# Patient Record
Sex: MALE | Race: WHITE | HISPANIC OR LATINO | Employment: OTHER | URBAN - METROPOLITAN AREA
[De-identification: names, ages, dates, MRNs, and addresses within clinical notes are randomized per-mention and may not be internally consistent; named-entity substitution may affect disease eponyms.]

---

## 2019-12-12 PROBLEM — K21.9 GERD (GASTROESOPHAGEAL REFLUX DISEASE): Status: ACTIVE | Noted: 2019-12-12

## 2019-12-23 DIAGNOSIS — C81.90 HODGKIN DISEASE IN CHILD (HCC): ICD-10-CM

## 2020-06-02 ENCOUNTER — HOSPITAL ENCOUNTER (OUTPATIENT)
Facility: MEDICAL CENTER | Age: 50
End: 2020-06-02
Admitting: HOSPITALIST
Payer: COMMERCIAL

## 2020-06-02 ENCOUNTER — APPOINTMENT (OUTPATIENT)
Dept: RADIOLOGY | Facility: MEDICAL CENTER | Age: 50
End: 2020-06-02
Attending: HOSPITALIST
Payer: COMMERCIAL

## 2020-06-02 ENCOUNTER — HOSPITAL ENCOUNTER (OUTPATIENT)
Facility: MEDICAL CENTER | Age: 50
End: 2020-06-04
Attending: HOSPITALIST | Admitting: HOSPITALIST
Payer: COMMERCIAL

## 2020-06-02 DIAGNOSIS — G45.9 TIA (TRANSIENT ISCHEMIC ATTACK): ICD-10-CM

## 2020-06-02 PROBLEM — E87.0 HYPERNATREMIA: Status: ACTIVE | Noted: 2020-06-02

## 2020-06-02 PROBLEM — E66.9 OBESITY: Status: ACTIVE | Noted: 2020-06-02

## 2020-06-02 PROBLEM — M54.2 NECK PAIN: Status: ACTIVE | Noted: 2020-06-02

## 2020-06-02 PROBLEM — R53.1 LEFT-SIDED WEAKNESS: Status: ACTIVE | Noted: 2020-06-02

## 2020-06-02 PROCEDURE — 99358 PROLONG SERVICE W/O CONTACT: CPT | Performed by: HOSPITALIST

## 2020-06-02 PROCEDURE — 99204 OFFICE O/P NEW MOD 45 MIN: CPT | Mod: 25 | Performed by: HOSPITALIST

## 2020-06-02 PROCEDURE — G0378 HOSPITAL OBSERVATION PER HR: HCPCS

## 2020-06-02 RX ORDER — SODIUM CHLORIDE 450 MG/100ML
INJECTION, SOLUTION INTRAVENOUS CONTINUOUS
Status: DISCONTINUED | OUTPATIENT
Start: 2020-06-03 | End: 2020-06-03

## 2020-06-02 RX ORDER — POLYETHYLENE GLYCOL 3350 17 G/17G
1 POWDER, FOR SOLUTION ORAL
Status: DISCONTINUED | OUTPATIENT
Start: 2020-06-02 | End: 2020-06-04 | Stop reason: HOSPADM

## 2020-06-02 RX ORDER — AMOXICILLIN 250 MG
2 CAPSULE ORAL 2 TIMES DAILY
Status: DISCONTINUED | OUTPATIENT
Start: 2020-06-02 | End: 2020-06-04 | Stop reason: HOSPADM

## 2020-06-02 RX ORDER — BISACODYL 10 MG
10 SUPPOSITORY, RECTAL RECTAL
Status: DISCONTINUED | OUTPATIENT
Start: 2020-06-02 | End: 2020-06-04 | Stop reason: HOSPADM

## 2020-06-02 RX ORDER — ASPIRIN 325 MG
325 TABLET ORAL DAILY
Status: DISCONTINUED | OUTPATIENT
Start: 2020-06-03 | End: 2020-06-04 | Stop reason: HOSPADM

## 2020-06-02 RX ORDER — ASPIRIN 81 MG/1
324 TABLET, CHEWABLE ORAL DAILY
Status: DISCONTINUED | OUTPATIENT
Start: 2020-06-03 | End: 2020-06-04 | Stop reason: HOSPADM

## 2020-06-02 RX ORDER — ATORVASTATIN CALCIUM 80 MG/1
80 TABLET, FILM COATED ORAL EVERY EVENING
Status: DISCONTINUED | OUTPATIENT
Start: 2020-06-03 | End: 2020-06-04 | Stop reason: HOSPADM

## 2020-06-02 RX ORDER — ASPIRIN 300 MG/1
300 SUPPOSITORY RECTAL DAILY
Status: DISCONTINUED | OUTPATIENT
Start: 2020-06-03 | End: 2020-06-04 | Stop reason: HOSPADM

## 2020-06-02 ASSESSMENT — ENCOUNTER SYMPTOMS
WEAKNESS: 1
VOMITING: 0
PALPITATIONS: 0
HEMOPTYSIS: 0
ABDOMINAL PAIN: 0
SENSORY CHANGE: 1
MYALGIAS: 0
FEVER: 0
FLANK PAIN: 0
CHILLS: 0
BRUISES/BLEEDS EASILY: 0
COUGH: 0
SPEECH CHANGE: 0
DOUBLE VISION: 0
DEPRESSION: 0
SHORTNESS OF BREATH: 0
HEARTBURN: 0
BLURRED VISION: 0
DIZZINESS: 0
HALLUCINATIONS: 0
FOCAL WEAKNESS: 0
NAUSEA: 0
EYE DISCHARGE: 0

## 2020-06-02 ASSESSMENT — LIFESTYLE VARIABLES: SUBSTANCE_ABUSE: 0

## 2020-06-03 ENCOUNTER — APPOINTMENT (OUTPATIENT)
Dept: CARDIOLOGY | Facility: MEDICAL CENTER | Age: 50
End: 2020-06-03
Attending: HOSPITALIST
Payer: COMMERCIAL

## 2020-06-03 ENCOUNTER — APPOINTMENT (OUTPATIENT)
Dept: RADIOLOGY | Facility: MEDICAL CENTER | Age: 50
End: 2020-06-03
Attending: HOSPITALIST
Payer: COMMERCIAL

## 2020-06-03 PROBLEM — E87.0 HYPERNATREMIA: Status: RESOLVED | Noted: 2020-06-02 | Resolved: 2020-06-03

## 2020-06-03 LAB
ALBUMIN SERPL BCP-MCNC: 4.3 G/DL (ref 3.2–4.9)
ALBUMIN/GLOB SERPL: 1.5 G/DL
ALP SERPL-CCNC: 59 U/L (ref 30–99)
ALT SERPL-CCNC: 40 U/L (ref 2–50)
ANION GAP SERPL CALC-SCNC: 11 MMOL/L (ref 7–16)
AST SERPL-CCNC: 19 U/L (ref 12–45)
BASOPHILS # BLD AUTO: 0.6 % (ref 0–1.8)
BASOPHILS # BLD: 0.03 K/UL (ref 0–0.12)
BILIRUB SERPL-MCNC: 0.4 MG/DL (ref 0.1–1.5)
BUN SERPL-MCNC: 12 MG/DL (ref 8–22)
CALCIUM SERPL-MCNC: 9.6 MG/DL (ref 8.5–10.5)
CHLORIDE SERPL-SCNC: 104 MMOL/L (ref 96–112)
CHOLEST SERPL-MCNC: 240 MG/DL (ref 100–199)
CK SERPL-CCNC: 77 U/L (ref 0–154)
CO2 SERPL-SCNC: 25 MMOL/L (ref 20–33)
CREAT SERPL-MCNC: 0.85 MG/DL (ref 0.5–1.4)
EOSINOPHIL # BLD AUTO: 0.14 K/UL (ref 0–0.51)
EOSINOPHIL NFR BLD: 2.6 % (ref 0–6.9)
ERYTHROCYTE [DISTWIDTH] IN BLOOD BY AUTOMATED COUNT: 41.4 FL (ref 35.9–50)
EST. AVERAGE GLUCOSE BLD GHB EST-MCNC: 108 MG/DL
GLOBULIN SER CALC-MCNC: 2.9 G/DL (ref 1.9–3.5)
GLUCOSE SERPL-MCNC: 95 MG/DL (ref 65–99)
HBA1C MFR BLD: 5.4 % (ref 0–5.6)
HCT VFR BLD AUTO: 47.5 % (ref 42–52)
HDLC SERPL-MCNC: 32 MG/DL
HGB BLD-MCNC: 16.3 G/DL (ref 14–18)
IMM GRANULOCYTES # BLD AUTO: 0.01 K/UL (ref 0–0.11)
IMM GRANULOCYTES NFR BLD AUTO: 0.2 % (ref 0–0.9)
LDLC SERPL CALC-MCNC: 180 MG/DL
LV EJECT FRACT  99904: 60
LV EJECT FRACT MOD 2C 99903: 66
LV EJECT FRACT MOD 4C 99902: 56.45
LV EJECT FRACT MOD BP 99901: 61.92
LYMPHOCYTES # BLD AUTO: 2.13 K/UL (ref 1–4.8)
LYMPHOCYTES NFR BLD: 39.8 % (ref 22–41)
MCH RBC QN AUTO: 31.2 PG (ref 27–33)
MCHC RBC AUTO-ENTMCNC: 34.3 G/DL (ref 33.7–35.3)
MCV RBC AUTO: 91 FL (ref 81.4–97.8)
MONOCYTES # BLD AUTO: 0.38 K/UL (ref 0–0.85)
MONOCYTES NFR BLD AUTO: 7.1 % (ref 0–13.4)
NEUTROPHILS # BLD AUTO: 2.66 K/UL (ref 1.82–7.42)
NEUTROPHILS NFR BLD: 49.7 % (ref 44–72)
NRBC # BLD AUTO: 0 K/UL
NRBC BLD-RTO: 0 /100 WBC
PLATELET # BLD AUTO: 227 K/UL (ref 164–446)
PMV BLD AUTO: 8.6 FL (ref 9–12.9)
POTASSIUM SERPL-SCNC: 4.1 MMOL/L (ref 3.6–5.5)
PROT SERPL-MCNC: 7.2 G/DL (ref 6–8.2)
RBC # BLD AUTO: 5.22 M/UL (ref 4.7–6.1)
SODIUM SERPL-SCNC: 140 MMOL/L (ref 135–145)
TRIGL SERPL-MCNC: 141 MG/DL (ref 0–149)
WBC # BLD AUTO: 5.4 K/UL (ref 4.8–10.8)

## 2020-06-03 PROCEDURE — A9270 NON-COVERED ITEM OR SERVICE: HCPCS | Performed by: HOSPITALIST

## 2020-06-03 PROCEDURE — 70498 CT ANGIOGRAPHY NECK: CPT

## 2020-06-03 PROCEDURE — 700102 HCHG RX REV CODE 250 W/ 637 OVERRIDE(OP): Performed by: HOSPITALIST

## 2020-06-03 PROCEDURE — 97165 OT EVAL LOW COMPLEX 30 MIN: CPT

## 2020-06-03 PROCEDURE — 82550 ASSAY OF CK (CPK): CPT

## 2020-06-03 PROCEDURE — 80061 LIPID PANEL: CPT

## 2020-06-03 PROCEDURE — 93306 TTE W/DOPPLER COMPLETE: CPT

## 2020-06-03 PROCEDURE — 700105 HCHG RX REV CODE 258: Performed by: HOSPITALIST

## 2020-06-03 PROCEDURE — 36415 COLL VENOUS BLD VENIPUNCTURE: CPT

## 2020-06-03 PROCEDURE — 93306 TTE W/DOPPLER COMPLETE: CPT | Mod: 26 | Performed by: INTERNAL MEDICINE

## 2020-06-03 PROCEDURE — 72142 MRI NECK SPINE W/DYE: CPT

## 2020-06-03 PROCEDURE — A9576 INJ PROHANCE MULTIPACK: HCPCS | Performed by: HOSPITALIST

## 2020-06-03 PROCEDURE — 97161 PT EVAL LOW COMPLEX 20 MIN: CPT

## 2020-06-03 PROCEDURE — 700117 HCHG RX CONTRAST REV CODE 255: Performed by: HOSPITALIST

## 2020-06-03 PROCEDURE — G0378 HOSPITAL OBSERVATION PER HR: HCPCS

## 2020-06-03 PROCEDURE — 85025 COMPLETE CBC W/AUTO DIFF WBC: CPT

## 2020-06-03 PROCEDURE — 99225 PR SUBSEQUENT OBSERVATION CARE,LEVEL II: CPT | Performed by: INTERNAL MEDICINE

## 2020-06-03 PROCEDURE — 80053 COMPREHEN METABOLIC PANEL: CPT

## 2020-06-03 PROCEDURE — 83036 HEMOGLOBIN GLYCOSYLATED A1C: CPT

## 2020-06-03 RX ORDER — M-VIT,TX,IRON,MINS/CALC/FOLIC 27MG-0.4MG
1 TABLET ORAL DAILY
COMMUNITY

## 2020-06-03 RX ADMIN — ATORVASTATIN CALCIUM 80 MG: 80 TABLET, FILM COATED ORAL at 16:58

## 2020-06-03 RX ADMIN — SODIUM CHLORIDE: 4.5 INJECTION, SOLUTION INTRAVENOUS at 01:05

## 2020-06-03 RX ADMIN — GADOTERIDOL 20 ML: 279.3 INJECTION, SOLUTION INTRAVENOUS at 19:23

## 2020-06-03 RX ADMIN — ASPIRIN 325 MG: 325 TABLET, FILM COATED ORAL at 05:54

## 2020-06-03 RX ADMIN — IOHEXOL 100 ML: 350 INJECTION, SOLUTION INTRAVENOUS at 10:03

## 2020-06-03 ASSESSMENT — COGNITIVE AND FUNCTIONAL STATUS - GENERAL
MOBILITY SCORE: 24
SUGGESTED CMS G CODE MODIFIER DAILY ACTIVITY: CH
MOBILITY SCORE: 24
DAILY ACTIVITIY SCORE: 24
SUGGESTED CMS G CODE MODIFIER MOBILITY: CH
SUGGESTED CMS G CODE MODIFIER DAILY ACTIVITY: CH
DAILY ACTIVITIY SCORE: 24
SUGGESTED CMS G CODE MODIFIER MOBILITY: CH

## 2020-06-03 ASSESSMENT — ENCOUNTER SYMPTOMS
DOUBLE VISION: 0
SPEECH CHANGE: 0
HEADACHES: 0
WEAKNESS: 0
NAUSEA: 0
FOCAL WEAKNESS: 0
SORE THROAT: 0
SENSORY CHANGE: 0
COUGH: 0
PALPITATIONS: 0
TINGLING: 0
SHORTNESS OF BREATH: 0
BACK PAIN: 1
VOMITING: 0
DIZZINESS: 0
ABDOMINAL PAIN: 0

## 2020-06-03 ASSESSMENT — LIFESTYLE VARIABLES
ON A TYPICAL DAY WHEN YOU DRINK ALCOHOL HOW MANY DRINKS DO YOU HAVE: 0
AVERAGE NUMBER OF DAYS PER WEEK YOU HAVE A DRINK CONTAINING ALCOHOL: 0
EVER FELT BAD OR GUILTY ABOUT YOUR DRINKING: NO
TOTAL SCORE: 0
ALCOHOL_USE: NO
EVER HAD A DRINK FIRST THING IN THE MORNING TO STEADY YOUR NERVES TO GET RID OF A HANGOVER: NO
TOTAL SCORE: 0
HAVE YOU EVER FELT YOU SHOULD CUT DOWN ON YOUR DRINKING: NO
DOES PATIENT WANT TO STOP DRINKING: NO
EVER_SMOKED: NEVER
HOW MANY TIMES IN THE PAST YEAR HAVE YOU HAD 5 OR MORE DRINKS IN A DAY: 0
CONSUMPTION TOTAL: NEGATIVE
TOTAL SCORE: 0
HAVE PEOPLE ANNOYED YOU BY CRITICIZING YOUR DRINKING: NO

## 2020-06-03 ASSESSMENT — PATIENT HEALTH QUESTIONNAIRE - PHQ9
1. LITTLE INTEREST OR PLEASURE IN DOING THINGS: NOT AT ALL
2. FEELING DOWN, DEPRESSED, IRRITABLE, OR HOPELESS: NOT AT ALL
SUM OF ALL RESPONSES TO PHQ9 QUESTIONS 1 AND 2: 0

## 2020-06-03 ASSESSMENT — GAIT ASSESSMENTS
DISTANCE (FEET): 100
GAIT LEVEL OF ASSIST: SUPERVISED

## 2020-06-03 ASSESSMENT — ACTIVITIES OF DAILY LIVING (ADL): TOILETING: INDEPENDENT

## 2020-06-03 NOTE — THERAPY
Missed Therapy     Patient Name: Humberto Diane  Age:  49 y.o., Sex:  male  Medical Record #: [id number]  Today's Date: 6/3/2020       06/03/20 1601   Initial Contact Note    Initial Contact Note  Order Received and Verified. Speech Therapy Evaluation NOT Completed Because Patient Does Not Require Acute Speech Therapy at this Time.   Interdisciplinary Plan of Care Collaboration   IDT Collaboration with  Nursing   Collaboration Comments Order received for a cognitive-linguistic evaluation.  Patient admitted with TIA.  MRI of the brain was negative.  Discussed case with RN who stated the patient is not demonstrating any cognitive deficits and does not require SLP intervention at this time.  SLP will complete the order.  Please re-order with a change in status.

## 2020-06-03 NOTE — CARE PLAN
Problem: Communication  Goal: The ability to communicate needs accurately and effectively will improve  Outcome: PROGRESSING AS EXPECTED     Problem: Safety  Goal: Will remain free from injury  Outcome: PROGRESSING AS EXPECTED     Problem: Venous Thromboembolism (VTW)/Deep Vein Thrombosis (DVT) Prevention:  Goal: Patient will participate in Venous Thrombosis (VTE)/Deep Vein Thrombosis (DVT)Prevention Measures  Outcome: PROGRESSING AS EXPECTED     Problem: Bowel/Gastric:  Goal: Normal bowel function is maintained or improved  Outcome: PROGRESSING AS EXPECTED     Problem: Knowledge Deficit  Goal: Knowledge of disease process/condition, treatment plan, diagnostic tests, and medications will improve  Outcome: PROGRESSING AS EXPECTED

## 2020-06-03 NOTE — PROGRESS NOTES
[facility] Medicine Daily Progress Note    Date of Service  6/3/2020    Chief Complaint  49 y.o. male admitted 6/2/2020 with left sided weakness.    Hospital Course    50 yo man who presented with hand numbness that progressed to left sided paralysis. His symptoms then resolved. MRI brain and MRA head were unremarkable. CTA neck did not show significant stenosis. TTE showed evidence of early right to left shunt suggestive of intracardiac shunt. He was evaluated by [facility] and had no further needs.      Interval Problem Update  Sinus on telemetry  He says his weakness and numbness resolved.  He denied ever having speech or swallowing difficulty or facial droop. He is visual difficulty with close text/words, denies other visual changes  Denies urinary or bowel incontinence or difficulty.     Consultants/Specialty  [facility]    Code Status  Full Code    Disposition  Home tomorrow pending workup    Review of Systems  Review of Systems   Constitutional: Negative for malaise/fatigue.   HENT: Negative for sore throat.    Eyes: Negative for double vision.   Respiratory: Negative for cough and shortness of breath.    Cardiovascular: Negative for chest pain and palpitations.   Gastrointestinal: Negative for abdominal pain, nausea and vomiting.   Genitourinary: Negative for dysuria, frequency and urgency.   Musculoskeletal: Positive for back pain (chronic).   Skin: Negative for itching.   Neurological: Negative for dizziness, tingling, sensory change, speech change, focal weakness, weakness and headaches.        Physical Exam  Temp:  [36.1 °C (96.9 °F)-36.3 °C (97.4 °F)] 36.3 °C (97.4 °F)  Pulse:  [60-68] 63  Resp:  [16-17] 16  BP: (121-150)/(76-97) 134/87  SpO2:  [95 %-97 %] 95 %    Physical Exam  Vitals signs and nursing note reviewed.   Constitutional:       General: He is not in acute distress.     Appearance: He is not ill-appearing.   HENT:      Head: Normocephalic.      Mouth/Throat:      Mouth: Mucous membranes are moist.    Eyes:      General:         Right eye: No discharge.         Left eye: No discharge.   Cardiovascular:      Rate and Rhythm: Normal rate and regular rhythm.   Pulmonary:      Effort: Pulmonary effort is normal.      Breath sounds: No wheezing or rales.   Abdominal:      Palpations: Abdomen is soft.      Tenderness: There is no abdominal tenderness. There is no guarding or rebound.   Musculoskeletal:         General: No swelling.   Skin:     General: Skin is warm and dry.   Neurological:      Mental Status: He is alert and oriented to person, place, and time.      Comments: AOx4, normal speech, no facial droop, 5/5 strength UE and LE, normal finger-to-nose bilaterally, normal heel-shin bilaterally, sensation intact   Psychiatric:         Mood and Affect: Mood normal.         Behavior: Behavior normal.         Fluids  No intake or output data in the 24 hours ending 06/03/20 1643    Laboratory  Recent Labs     06/02/20  1630 06/03/20  0202   WBC 5.3 5.4   RBC 4.92 5.22   HEMOGLOBIN 15.2 16.3   HEMATOCRIT 43.5 47.5   MCV 88.4 91.0   MCH 30.9 31.2   MCHC 34.9 34.3   RDW 12.5 41.4   PLATELETCT 232 227   MPV 8.7 8.6*     Recent Labs     06/02/20  1630 06/03/20  0202   SODIUM 147* 140   POTASSIUM 3.8 4.1   CHLORIDE 109* 104   CO2 24 25   GLUCOSE 139* 95   BUN 15 12   CREATININE 1.2 0.85   CALCIUM 11.0 9.6             Recent Labs     06/03/20  0202   TRIGLYCERIDE 141   HDL 32*   *       Imaging  EC-ECHOCARDIOGRAM COMPLETE W/O CONT   Final Result      CT-CTA NECK WITH & W/O-POST PROCESSING   Final Result      No high-grade stenosis, large vessel occlusion, aneurysm or dissection.      MR-CERVICAL SPINE-WITH    (Results Pending)        Assessment/Plan  * Left-sided weakness  Assessment & Plan  MRI brain w/o, MRA head unremarkable   CTA neck neg for significant stenosis  , chol 240  TTE showed Evidence of early (0-5 beats) right to left shunt  suggestive of   intracardiac shunt (ASD or PFO).  MRI C spine  pending  On aspirin and statin    Obesity  Assessment & Plan  Encouraged weight loss    Neck pain  Assessment & Plan  Suspect probably cervical radiculopathy based on symptoms   Check MRI cervical spine w/    GERD (gastroesophageal reflux disease)- (present on admission)  Assessment & Plan  Controlled cont to monitor       VTE prophylaxis: bernas

## 2020-06-03 NOTE — PROGRESS NOTES
ASsumed care of pt, he is alert and oriented/denies pain. NIH performed, see flowsheet. Discussed POC with NOC RN at bedside. Discussed safety. Bed is locked in lowest position. Call light/personal belongings are within reach.

## 2020-06-03 NOTE — PROGRESS NOTES
2 RN Skin Check    2 RN skin check complete w/ Colette RN.   Devices in place: Tele box, peripheral IV.  Skin assessed under devices: yes.  Confirmed pressure ulcers found on: N/A.  New potential pressure ulcers noted on N/A. Wound consult placed No.  The following interventions in place Pillows, Lotion and Patinet can turn from side to side.    Ears: pink, blanching  Elbows: dry, intact  Sacrum: intact  Heels/feet: dry, pink, blanching

## 2020-06-03 NOTE — CARE PLAN
Problem: Communication  Goal: The ability to communicate needs accurately and effectively will improve  Outcome: PROGRESSING AS EXPECTED  Intervention: [place] patient and significant other/support system to call light to alert staff of needs  Oriented to:: All of the Following : Location of Bathroom, Visiting Policy, Unit Routine, Call Light and Bedside Controls, Bedside Rail Policy, Smoking Policy, Rights and Responsibilities, Bedside Report, and Patient Education Notebook     Problem: Safety  Goal: Will remain free from falls  Outcome: PROGRESSING AS EXPECTED  Intervention: Implement fall precautions  Note: Patient is a low fall risk and has a steady gait. Bed is locked and in lowest position. Call light and belongings within reach. Treaded socks on. Will continue to monitor.

## 2020-06-03 NOTE — THERAPY
Physical Therapy   Initial Evaluation     Patient Name: Humberto Diane  Age:  49 y.o., Sex:  male  Medical Record #: [id number]  Today's Date: 6/3/2020    Assessment  Patient is 49 y.o. male with a diagnosis of TIA.  Patient denies any LE symptoms, states his neck is still sore, but doesn't believe it is correlated with the left sided weakness and inability to move his right hand for 1.5 weeks.  PMHx includes Obesity, hypernatremia, and Gerd.  Patient demonstrates good enough functional mobility to home.  Patient not receptive to reducing salt intake, increasing water intake, and starting exercise program to reduce risk of future cardiovascular event.  Secondary to patient's unwillingness to make lifestyle changes, patient at higher risk for future cardiovascular event.  Recommend ambulation with nursing staff daily.       06/03/20 0915   Non Verbal Descriptors   Non Verbal Scale  Calm   Prior Living Situation   Prior Services None   Housing / Facility 1 Story House   Steps Into Home 4   Steps In Home 0   Equipment Owned None   Lives with - Patient's Self Care Capacity Spouse;Child Less than 18 Years of Age   Prior Level of Functional Mobility   Bed Mobility Independent   Transfer Status Independent   Ambulation Independent   Distance Ambulation (Feet) 150   Assistive Devices Used None   Stairs Independent   Cognition    Cognition / Consciousness WDL   Level of Consciousness Alert   Passive ROM Lower Body   Passive ROM Lower Body WDL   Active ROM Lower Body    Active ROM Lower Body  WDL   Strength Lower Body   Lower Body Strength  WDL   Sensation Lower Body   Lower Extremity Sensation   WDL   Balance Assessment   Sitting Balance (Static) Good   Sitting Balance (Dynamic) Good   Standing Balance (Static) Good   Standing Balance (Dynamic) Good   Weight Shift Sitting Good   Weight Shift Standing Good   Comments no AD   Gait Analysis   Gait Level Of Assist Supervised   Assistive Device None   Distance (Feet) 100   # of Times  Distance was Traveled 1   # of Stairs Climbed 3   Level of Assist with Stairs Supervised   Weight Bearing Status fwb   Comments no difficulty with Funcitonal Mobility   Bed Mobility    Supine to Sit Supervised   Sit to Supine Supervised   Functional Mobility   Sit to Stand Supervised   How much difficulty does the patient currently have...   Turning over in bed (including adjusting bedclothes, sheets and blankets)? 4   Sitting down on and standing up from a chair with arms (e.g., wheelchair, bedside commode, etc.) 4   Moving from lying on back to sitting on the side of the bed? 4   How much help from another person does the patient currently need...   Moving to and from a bed to a chair (including a wheelchair)? 4   Need to walk in a hospital room? 4   Climbing 3-5 steps with a railing? 4   6 clicks Mobility Score 24   Education Group   Education Provided   (educated patient on diet and exercise needs for health)   Additional Comments pt declined outpatient PT.       Plan    Recommend Physical Therapy for Evaluation only     Discharge recommendations:  Home with outpatient physical therapy - patient states he does not want outpatient physical therapy for cardiac prevention.

## 2020-06-03 NOTE — PROGRESS NOTES
Med Rec Updated and Complete per Pt's family over the phone  Allergies Reviewed  No PO ABX last 14 days.    Family reports Pt only took ASA for chest pain.

## 2020-06-03 NOTE — THERAPY
Occupational Therapy   Initial Evaluation     Patient Name: Humberto Diane  Age:  49 y.o., Sex:  male  Medical Record #: [id number]  Today's Date: 6/3/2020                           Assessment  Patient is 49 y.o. male with a diagnosis of TIA.  Pt is at or near his/her functional baseline. Pt with no further skilled OT needs in the acute care setting at this time.       Plan    Occupational Therapy for Evaluation only     Discharge recommendations:  No further OT needs after D/C       06/03/20 0735   Prior Living Situation   Prior Services None   Housing / Facility 1 Story House   Steps Into Home 4   Steps In Home 0   Equipment Owned None   Lives with - Patient's Self Care Capacity Spouse;Child Less than 18 Years of Age   Prior Level of ADL Function   Self Feeding Independent   Grooming / Hygiene Independent   Bathing Independent   Dressing Independent   Toileting Independent   ADL Assessment   Upper Body Dressing Independent   Lower Body Dressing Independent   Toileting Independent   Functional Mobility   Sit to Stand Supervised   Bed, Chair, Wheelchair Transfer Supervised   Toilet Transfers Supervised   Session Information   Priority 0

## 2020-06-03 NOTE — PROGRESS NOTES
Received ED to inpatient transfer request from [facility]   Sending Physician: Eric      Diagnosis TIA   Patient Accepted by: Chandu TRAN     Patient coming via: TBD  ETA: TBD   Nursing to notify Direct Admit On-Call hospitalist when patient arrives

## 2020-06-03 NOTE — ASSESSMENT & PLAN NOTE
MRI brain w/o, MRA head unremarkable   CTA neck neg for significant stenosis  , chol 240  TTE showed Evidence of early (0-5 beats) right to left shunt  suggestive of   intracardiac shunt (ASD or PFO).  MRI C spine pending  On aspirin and statin

## 2020-06-03 NOTE — PROGRESS NOTES
Patient is a direct admit, assumed care of patient from EMS, report received from Oly BYRNE. Patient signed consent to treat form. Patient placed on the monitor. Monitor room called, patient is SR in the 70's. Patient is A&O X4. Patient vital signs stable. Patient oriented to room and floor. Updated on POC, call light within reach and fall precautions in place. Bed locked and in lowest position. Patient instructed to call for assistance before getting out of bed. All questions answered, no other needs at this time. On call hospitalist Dr. Yolanda sanchez.

## 2020-06-03 NOTE — H&P
Hospital Medicine History & Physical Note    Date of Service  6/2/2020    Primary Care Physician  TANMAY Mcclain    Code Status  Full     Chief Complaint  Left sided weakness     History of Presenting Illness  49 y.o. male who presented 6/2/2020 with no significant past medical history presents with left-sided weakness.  This patient states about a week and a half ago his right hand started having some numbness and tingling.  Subsequently he had inability to open his hand this lasted for several hours.  It was associated with some neck and upper back pain.  This resided on its own.  Last Saturday he started developing some upper neck and back pain.  And then acute left-sided paralysis.  This lasted about 5 minutes.  Associated with numbness and tingling.  Otherwise no known alleviating or exacerbating factors to his symptoms.  To my examination continues to have left-sided numbness and tingling and will be admitted to the hospital for further TIA work-up.    Upon review of outside hospital records    WBC count 5.3 hemoglobin 15.2 platelet count 232  Sodium 147 potassium 3.8 chloride 108 CO2 24 anion gap 18 glucose 139 BUN 15 creatinine 1.2 calcium 11  Troponin 0.02  Urinalysis unremarkable  MRI brain without unremarkable  MRA of head unremarkable MRA of [place] of [place]  Review of Systems  Review of Systems   Constitutional: Positive for malaise/fatigue. Negative for chills and fever.   HENT: Negative for congestion, hearing loss and tinnitus.    Eyes: Negative for blurred vision, double vision and discharge.   Respiratory: Negative for cough, hemoptysis and shortness of breath.    Cardiovascular: Negative for chest pain, palpitations and leg swelling.   Gastrointestinal: Negative for abdominal pain, heartburn, nausea and vomiting.   Genitourinary: Negative for dysuria and flank pain.   Musculoskeletal: Negative for joint pain and myalgias.   Skin: Negative for rash.   Neurological: Positive for sensory  change and weakness. Negative for dizziness, speech change and focal weakness.   Endo/Heme/Allergies: Negative for environmental allergies. Does not bruise/bleed easily.   Psychiatric/Behavioral: Negative for depression, hallucinations and substance abuse.       Past Medical History   has a past medical history of Bloating, Gas, and History of chicken pox.    Surgical History  Reviewed and not pertinent     Family History  Reviewed and not pertinent     Social History   reports that he has never smoked. He has never used smokeless tobacco. He reports current alcohol use. He reports that he does not use drugs.    Allergies  No Known Allergies    Medications  Prior to Admission Medications   Prescriptions Last Dose Informant Patient Reported? Taking?   aspirin (ASA) 81 MG Chew Tab chewable tablet   Yes No   Sig: Take 81 mg by mouth every day.      Facility-Administered Medications: None       Physical Exam  BP: ()/()     Physical Exam  Vitals signs reviewed.   Constitutional:       General: He is not in acute distress.     Appearance: He is ill-appearing.   HENT:      Head: Normocephalic and atraumatic.      Nose: No congestion.      Mouth/Throat:      Mouth: Mucous membranes are dry.   Eyes:      Extraocular Movements: Extraocular movements intact.      Pupils: Pupils are equal, round, and reactive to light.   Neck:      Musculoskeletal: Neck supple.   Cardiovascular:      Rate and Rhythm: Normal rate and regular rhythm.      Pulses: Normal pulses.      Heart sounds: Normal heart sounds.   Pulmonary:      Effort: Pulmonary effort is normal. No respiratory distress.      Breath sounds: Normal breath sounds. No wheezing.   Abdominal:      General: Bowel sounds are normal. There is no distension.      Palpations: Abdomen is soft.      Tenderness: There is no abdominal tenderness.   Musculoskeletal:         General: No swelling.   Skin:     General: Skin is warm and dry.      Capillary Refill: Capillary refill takes 2 to  3 seconds.   Neurological:      General: No focal deficit present.      Mental Status: He is alert and oriented to person, place, and time. Mental status is at baseline.      Comments: No focal deficits   Psychiatric:         Mood and Affect: Mood normal.         Behavior: Behavior normal.         Thought Content: Thought content normal.         Judgment: Judgment normal.         Laboratory:  Recent Labs     06/02/20  1630   WBC 5.3   RBC 4.92   HEMOGLOBIN 15.2   HEMATOCRIT 43.5   MCV 88.4   MCH 30.9   MCHC 34.9   RDW 12.5   PLATELETCT 232   MPV 8.7     Recent Labs     06/02/20  1630   SODIUM 147*   POTASSIUM 3.8   CHLORIDE 109*   CO2 24   GLUCOSE 139*   BUN 15   CREATININE 1.2   CALCIUM 11.0     Recent Labs     06/02/20  1630   GLUCOSE 139*         No results for input(s): NTPROBNP in the last 72 hours.      No results for input(s): TROPONINT in the last 72 hours.    Imaging:  No orders to display         Assessment/Plan:  Anticipate less than 2 midnight hospital stay     * Left-sided weakness  Assessment & Plan  Acute left sided weakness occurred on Saturday  Outside window for TPA, and permissive htn   Asa, statin therapy  MRI brain w/o, MRA head unremarkable   CTA neck ordered for carotid eval  echocardiogarm   Asa, statin therapy   A1c, lipid panel     Obesity  Assessment & Plan  Encouraged weight loss    Hypernatremia  Assessment & Plan  Cont with 1/2 ns overnight reassess in am       Neck pain  Assessment & Plan  Suspect probably cervical radiculopathy based on symptoms   Check MRI cervical spine w/    GERD (gastroesophageal reflux disease)- (present on admission)  Assessment & Plan  Controlled cont to monitor    Ppx: SCD    I spent a total of 31 minutes of non face to face time performing additional research, reviewing old medical records, provided on going management by following up on labs, placing orders, discussing plan of care with other healthcare providers and nursing staff. Start time: 11:15 pm. End  time: 11:46 pm

## 2020-06-04 ENCOUNTER — PATIENT OUTREACH (OUTPATIENT)
Dept: HEALTH INFORMATION MANAGEMENT | Facility: OTHER | Age: 50
End: 2020-06-04

## 2020-06-04 VITALS
OXYGEN SATURATION: 97 % | SYSTOLIC BLOOD PRESSURE: 140 MMHG | TEMPERATURE: 96.8 F | RESPIRATION RATE: 18 BRPM | DIASTOLIC BLOOD PRESSURE: 87 MMHG | BODY MASS INDEX: 30.07 KG/M2 | WEIGHT: 198.41 LBS | HEIGHT: 68 IN | HEART RATE: 65 BPM

## 2020-06-04 PROBLEM — M54.2 NECK PAIN: Status: RESOLVED | Noted: 2020-06-02 | Resolved: 2020-06-04

## 2020-06-04 PROBLEM — R53.1 LEFT-SIDED WEAKNESS: Status: RESOLVED | Noted: 2020-06-02 | Resolved: 2020-06-04

## 2020-06-04 PROCEDURE — 700102 HCHG RX REV CODE 250 W/ 637 OVERRIDE(OP): Performed by: HOSPITALIST

## 2020-06-04 PROCEDURE — G0378 HOSPITAL OBSERVATION PER HR: HCPCS

## 2020-06-04 PROCEDURE — 99217 PR OBSERVATION CARE DISCHARGE: CPT | Performed by: INTERNAL MEDICINE

## 2020-06-04 PROCEDURE — A9270 NON-COVERED ITEM OR SERVICE: HCPCS | Performed by: HOSPITALIST

## 2020-06-04 RX ORDER — ATORVASTATIN CALCIUM 40 MG/1
40 TABLET, FILM COATED ORAL EVERY EVENING
Qty: 30 TAB | Refills: 1 | Status: SHIPPED | OUTPATIENT
Start: 2020-06-04

## 2020-06-04 RX ADMIN — ASPIRIN 325 MG: 325 TABLET, FILM COATED ORAL at 05:19

## 2020-06-04 RX ADMIN — DOCUSATE SODIUM 50 MG AND SENNOSIDES 8.6 MG 2 TABLET: 8.6; 5 TABLET, FILM COATED ORAL at 05:19

## 2020-06-04 ASSESSMENT — FIBROSIS 4 INDEX: FIB4 SCORE: 0.66

## 2020-06-04 NOTE — PROGRESS NOTES
Received hreport from CATY Malhotra. POC discussed. CATY Niño present. Patient currently off unit for MRI. Will continue to monitor.

## 2020-06-04 NOTE — PROGRESS NOTES
Assumed care of patient, bedside report received from CATY Niño. Updated on POC, call light within reach and fall precautions in place. Bed locked and in lowest position. Patient instructed to call for assistance before getting out of bed. All questions answered, no other needs at this time.

## 2020-06-04 NOTE — CARE PLAN
Problem: Safety  Goal: Will remain free from falls  Outcome: PROGRESSING AS EXPECTED   Bed locked and in lowest position. Treaded socks in use. Call light and belongings within reach. Patient educated to call for assistance. Patient verbalizes understanding. Hourly rounding in place.     Problem: Knowledge Deficit  Goal: Knowledge of disease process/condition, treatment plan, diagnostic tests, and medications will improve  Outcome: PROGRESSING AS EXPECTED   Discussed plan of care and medications with patient, patient verbalizes understanding.     Problem: Discharge Barriers/Planning  Goal: Patient's continuum of care needs will be met  Outcome: PROGRESSING AS EXPECTED   No barriers to discharge at this time. Awaiting physician rounds.

## 2020-06-04 NOTE — PROGRESS NOTES
Rec'd report from day shift  RN regarding prior 12 hours. Pt awake in bed; A&Ox4. POC reviewed with pt. Pt denies any pain at this time. All questions answered. White board updated. Bed locked in lowest position. Call light within reach. Will continue to monitor.

## 2020-06-04 NOTE — DISCHARGE SUMMARY
Discharge Summary    CHIEF COMPLAINT ON ADMISSION  No chief complaint on file.      Reason for Admission  TIA     Admission Date  6/2/2020    CODE STATUS  Full Code    HPI & HOSPITAL COURSE  50 yo man who presented with hand numbness that progressed to left sided paralysis. His symptoms then resolved. He presented to [facility] but transferred for possible stroke/TIA. MRI brain and MRA head were unremarkable. MRI C-spine with contrast was unremarkable. CTA neck did not show significant stenosis. TTE showed evidence of early right to left shunt suggestive of intracardiac shunt. Telemetry showed sinus rhythm. He remained neurology stable. He was evaluated by [facility] and had no further needs.   Lipid panel showed  and total cholesterol 240. A1c was 5.4%. He was started on aspirin and lipitor for stroke prevention. I discussed return precautions with him.    Therefore, he is discharged in good and stable condition to home with close outpatient follow-up.    Discharge Date  6/4/2020    FOLLOW UP ITEMS POST DISCHARGE  New lipitor start, monitor side effect, recheck cholesterol panel in 3-4 weeks    DISCHARGE DIAGNOSES  Principal Problem (Resolved):    Left-sided weakness POA: Unknown  Active Problems:    GERD (gastroesophageal reflux disease) POA: Yes    Obesity POA: Yes  Resolved Problems:    Neck pain POA: Unknown    Hypernatremia POA: Unknown      FOLLOW UP  Gwen Burns, A.P.N.  [address] [facility] [address]  [phone]    Schedule an appointment as soon as possible for a visit  Please call to schedule a hospital folow up with your primary care provider. Thank you!       MEDICATIONS ON DISCHARGE     Medication List      START taking these medications      Instructions   atorvastatin 40 MG Tabs  Commonly known as:  LIPITOR   Take 1 Tab by mouth every evening.  Dose:  40 mg        CHANGE how you take these medications      Instructions   aspirin EC 81 MG Tbec  What changed:    · when to take  this  · reasons to take this  Commonly known as:  ECOTRIN   Take 1 Tab by mouth every day.  Dose:  81 mg        CONTINUE taking these medications      Instructions   therapeutic multivitamin-minerals Tabs   Take 1 Tab by mouth every day.  Dose:  1 Tab            Allergies  No Known Allergies    DIET  Orders Placed This Encounter   Procedures   • Diet Order Regular     Standing Status:   Standing     Number of Occurrences:   1     Order Specific Question:   Diet:     Answer:   Regular [1]       ACTIVITY  As tolerated.  Weight bearing as tolerated    CONSULTATIONS  None    PROCEDURES  MR-CERVICAL SPINE-WITH   Final Result      1.  No acute abnormality or pathologic enhancement. No abnormal cervical cord signal.   2.  Multilevel disc degeneration with foraminal narrowing as detailed above.   3.  No significant spinal stenosis.      EC-ECHOCARDIOGRAM COMPLETE W/O CONT   Final Result      CT-CTA NECK WITH & W/O-POST PROCESSING   Final Result      No high-grade stenosis, large vessel occlusion, aneurysm or dissection.            LABORATORY  Lab Results   Component Value Date    SODIUM 140 06/03/2020    POTASSIUM 4.1 06/03/2020    CHLORIDE 104 06/03/2020    CO2 25 06/03/2020    GLUCOSE 95 06/03/2020    BUN 12 06/03/2020    CREATININE 0.85 06/03/2020        Lab Results   Component Value Date    WBC 5.4 06/03/2020    HEMOGLOBIN 16.3 06/03/2020    HEMATOCRIT 47.5 06/03/2020    PLATELETCT 227 06/03/2020        Total time of the discharge process exceeds 32 minutes.

## 2020-06-04 NOTE — PROGRESS NOTES
Patient discharged home in self care. Patient is alert and oriented x4. IVs and tele box removed. Discharge instructions printed and provided to patient. Discussed diagnosis, medications, and follow up appointments with the patient. Patient verbalized understanding. All questions and concerns addressed. Patient escorted off unit by this RN. Patient discharged via uber.

## 2020-06-04 NOTE — DISCHARGE INSTRUCTIONS
Discharge Instructions per Esa Garcia M.D.    DIAGNOSIS:  1. Your left sided weakness - Your MRI brain and MRI cervical spine were normal. You may have had a mini-stroke or TIA (transient ischemic attack). Please take aspirin every day and Atorvastatin (a cholesterol medication) to prevent future stroke.  2. Your Echocardiogram, or ultrasound of your heart, showed a possible small hole. This can sometimes be a risk of stroke, take aspirin every day as discussed for prevention.    Return to ER if you have recurrent weakness in your face, arms, or legs.                Discharge Instructions    Discharged to home by car with relative. Discharged via wheelchair, hospital escort: Refused.  Special equipment needed: Not Applicable    Be sure to schedule a follow-up appointment with your primary care doctor or any specialists as instructed.     Discharge Plan:        I understand that a diet low in cholesterol, fat, and sodium is recommended for good health. Unless I have been given specific instructions below for another diet, I accept this instruction as my diet prescription.   Other diet: N/A    Special Instructions: None    · Is patient discharged on Warfarin / Coumadin?   No     Depression / Suicide Risk    As you are discharged from this [facility] facility, it is important to learn how to keep safe from harming yourself.    Recognize the warning signs:  · Abrupt changes in personality, positive or negative- including increase in energy   · Giving away possessions  · Change in eating patterns- significant weight changes-  positive or negative  · Change in sleeping patterns- unable to sleep or sleeping all the time   · Unwillingness or inability to communicate  · Depression  · Unusual sadness, discouragement and loneliness  · Talk of wanting to die  · Neglect of personal appearance   · Rebelliousness- reckless behavior  · Withdrawal from people/activities they love  · Confusion- inability to concentrate     If  "you or a loved one observes any of these behaviors or has concerns about self-harm, here's what you can do:  · Talk about it- your feelings and reasons for harming yourself  · Remove any means that you might use to hurt yourself (examples: pills, rope, extension cords, firearm)  · Get professional help from the community (Mental Health, Substance Abuse, psychological counseling)  · Do not be alone:Call your Safe Contact- someone whom you trust who will be there for you.  · Call your local CRISIS HOTLINE 930-6447 or 100-466-6273  · Call your local Children's Mobile Crisis Response Team [place] (303) 405-6738 or www.[employer]  · Call the toll free National Suicide Prevention Hotlines   · National Suicide Prevention Lifeline 589-170-UGJF (8078)  · [place] [facility] 800-SUICIDE (458-6519)              Ataque isquémico transitorio  (Transient Ischemic Attack)  Un ataque isquémico transitorio (AIT) es un \"ictus de advertencia\" que causa síntomas similares al ictus. El AIT no causa un daño duradero en el cerebro. Los síntomas pueden aparecer rápido y no archibald mucho tiempo. Es importante conocer los síntomas de un AIT y qué hacer. De esta manera, se puede prevenir un ictus o la muerte.  CUIDADOS EN EL HOGAR  · [place] los medicamentos solamente alicia se lo haya indicado el médico. Asegúrese de comprender todas las indicaciones.  · Puede que tenga que demetrius aspirina o un medicamento llamado warfarina. Es necesario que tome la warfarina exactamente alicia se lo indicaron.  ¨ Demetrius demasiada o muy poca warfarina es peligroso. Debe hacerse análisis de nancy con la frecuencia indicada por el médico. Un análisis de nancy TP mide el tiempo que tarda la nancy en coagularse. El TP se utiliza para calcular otro valor llamado INR. Los resultados de esos análisis ayudan a que el médico ajuste la dosis de warfarina y se asegure de que esté tomando la cantidad correcta.  ¨ La alimentación puede causar problemas con la " warfarina y afecta los resultados de los análisis de nancy. [place] es válido para los alimentos con alto contenido de vitamina K. Coma la misma cantidad de alimentos con alto contenido de vitamina K todos los días. Los alimentos con alto contenido de vitamina K incluyen espinaca, col rizada, brócoli, repollo, acelga y nabos verdes, repollitos de [place], guisantes, coliflor, algas y perejil. Otros alimentos ricos en vitamina K incluyen hígado de cerdo y de parker, té chu y aceite de soja. Coma la misma cantidad de alimentos con alto contenido de vitamina K todos los días. Evite hacer cambios importantes en la dieta. Informe a waller médico antes de cambiar waller dieta. Hable con un especialista en alimentación (nutricionista) si tiene preguntas.  ¨ Muchos medicamentos pueden causar problemas con la warfarina y afectar los resultados de los análisis de INR y PT. Informe a waller médico sobre todos los medicamentos que veronica. [place] incluye vitaminas y suplementos nutricionales. No tome ni deje de jules ningún medicamento recetado o de venta felix, excepto si el médico se lo indica.  ¨ La warfarina puede causar más hematomas o sangrado. Ejerza presión sobre cualquier herida por más tiempo que lo habitual. Hable con waller médico sobre otros efectos secundarios de la warfarina.  ¨ Evite realizar actividades deportivas que puedan causar lesiones o sangrado.  ¨ Sea cuidadoso al afeitarse, utilizar hilo dental o manipular objetos filosos.  ¨ Evite el alcohol o herminia muy poco mientras veronica warfarina. Informe a waller médico si cambia la cantidad de alcohol que lucila.  ¨ Informe a waller dentista y a otros médicos que veronica warfarina antes de someterse a cualquier procedimiento.  · Siga waller programa de alimentación, según lo indicado, si tiene rain.  · Mantenga un peso saludable.  · Manténgase activo. Realice al menos 30 minutos de actividad todos o cici todos los días.  · No consuma ningún producto que contenga tabaco, lo que incluye cigarrillos, tabaco  de mascar o cigarrillos electrónicos. Si necesita ayuda para dejar de fumar, consulte al médico.  · Limite el consumo de alcohol a no más de 1 medida por día si es josué y no está embarazada, y 2 medidas si es hombres. Kylah medida equivale a 12 onzas de cerveza, 5 onzas de vino o 1½ onzas de bebidas alcohólicas de ximena graduación.  · No consuma drogas.  · Bo de waller hogar un lugar seguro para evitar caídas. Puede hacer lo siguiente:  ¨ Colocar barras para sostén en la habitación y el baño.  ¨ Elevar el inodoro.  ¨ Colocar un asiento en la ducha.  · Concurra a todas las visitas de control alicia se lo haya indicado el médico. [place] es importante.  SOLICITE AYUDA SI:  · Sufre cambios de personalidad.  · Presenta dificultad para tragar.  · Tiene visión doble.  · Tiene mareos.  · Tiene fiebre.  SOLICITE AYUDA DE INMEDIATO SI:  Estos síntomas pueden indicar kylah emergencia. No espere hasta que los síntomas desaparezcan. Solicite atención médica de inmediato. Comuníquese con el servicio de emergencias de waller localidad (911 en los [place]). No conduzca por ching propios medios hasta el hospital.   · Siente debilidad repentina o pierde la sensibilidad (tiene adormecimiento), especialmente en un lado del cuerpo. [place] puede afectar lo siguiente:  ¨ El edison.  ¨ El brazo.  ¨ La pierna.  · Tiene dificultad repentina para caminar.  · Tiene dificultad repentina para  los brazos o las piernas.  · Se siente repentinamente confundido.  · Presenta dificultad para hablar.  · Tiene dificultad para comprender.  · Presenta, súbitamente, dificultad para kristi de rain o ambos ojos.  · Pierde el equilibrio.  · Tiene movimientos anormales.  · Siente súbitamente un dolor de reji muy intenso que no tiene causa aparente.  · Le aparece dolor en el pecho.  · Los latidos cardíacos son inestables.  · No está consciente, en parte o totalmente, de lo que ocurre a waller alrededor.  ASEGÚRESE DE QUE:  · Comprende estas instrucciones.  · Controlará waller  afección.  · Recibirá ayuda de inmediato si no mejora o si empeora.  Esta información no tiene alicia fin reemplazar el consejo del médico. Asegúrese de hacerle al médico cualquier pregunta que tenga.  Document Released: 12/06/2010 Document Revised: 01/08/2016 Document Reviewed: 03/25/2015  [employer] Patient Education © 2017 [employer]                Prevención del ictus  (Stroke Prevention)  Algunos problemas de aminata y ciertas conductas favorecen el ictus. A continuación se indican algunas formas de disminuir el riesgo de tener un ictus.  · Bo alguna actividad física al menos lisa 30 minutos todos los días.  · No fume. Trate de no rodearse de personas que fuman.  · No herminia alcohol en exceso.  ¨ No herminia más de dos copas al día si es hombre.  ¨ No herminia más de michel copa al día si es josué y no está embarazada.  · Consuma alimentos saludables, alicia frutas y verduras. Si le indican michel dieta específica, sígala estrictamente.  · Mantenga ching niveles de colesterol bajo control con la dieta y medicamentos. Consuma alimentos bajos en grasas saturadas, grasas trans, colesterol y ricos en fibra.  · Si tiene diabetes, siga todos los planes dietéticos y tome los medicamentos alicia se le indique.  · Pregúntele al médico si necesita tratamiento para disminuir la presión arterial. Si tiene presión arterial ximena (hipertensión), siga michel dieta y tome los medicamentos alicia se lo haya indicado el médico.  · Si usted tiene entre 18 y 39 años, debe medirse la presión arterial cada 3 a 5 años. Si usted tiene 40 años o más, debe medirse la presión arterial todos los años.  · Mantenga un peso saludable. Consuma alimentos bajos en calorías, sal, grasas saturadas, grasas trans y colesterol.  · No consuma drogas.  · Evite las píldoras anticonceptivas, si corresponde. Hable con waller médico acerca de los riesgos de jules píldoras anticonceptivas.  · Hable con waller médico si usted tiene problemas de sueño (apnea del sueño).  · [place]  todos los medicamentos según las indicaciones de waller médico.  ¨ Es posible que le indiquen que tome aspirina o anticoagulantes. [place] los medicamentos alicia le indicó el médico.  ¨ Conozca todas las instrucciones de los medicamentos.  · Asegúrese de tener bajo control cualquier otra enfermedad que tenga.  SOLICITE AYUDA DE INMEDIATO SI:  · Pierde repentinamente la sensibilidad (siente adormecimiento) o siente debilidad en el edison, un brazo o michel pierna.  · Waller rosa o párpado se caen hacia un lado.  · Se siente súbitamente confundido.  · Tiene dificultad para hablar afasia o comprender lo que las personas dicen.  · Presenta dificultad para la visión de rain o ambos ojos.  · Tiene dificultad repentina para caminar.  · Tiene mareos.  · Pierde el equilibrio o ching movimientos son torpes (falta de coordinación).  · Siente un dolor de reji súbito e intenso y no sabe la causa.  · Siente un dolor nuevo en el pecho.  · Siente un aleteo en el corazón o le falta un latido (ritmo cardíaco irregular).  No espere para kristi si los síntomas desaparecen. Solicite ayuda de inmediato. Comuníquese con el servicio de emergencias de waller localidad (911 en los [place]). No conduzca por ching propios medios hasta el hospital.   Esta información no tiene alicia fin reemplazar el consejo del médico. Asegúrese de hacerle al médico cualquier pregunta que tenga.  Document Released: 06/18/2013 Document Revised: 01/08/2016 Document Reviewed: 06/20/2014  [employer]vier Interactive Patient Education © 2017 [employer]